# Patient Record
Sex: MALE | Race: WHITE | NOT HISPANIC OR LATINO | Employment: UNEMPLOYED | URBAN - NONMETROPOLITAN AREA
[De-identification: names, ages, dates, MRNs, and addresses within clinical notes are randomized per-mention and may not be internally consistent; named-entity substitution may affect disease eponyms.]

---
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
## 2023-03-09 ENCOUNTER — OFFICE VISIT (OUTPATIENT)
Dept: FAMILY MEDICINE CLINIC | Facility: CLINIC | Age: 23
End: 2023-03-09
Payer: COMMERCIAL

## 2023-03-09 ENCOUNTER — LAB (OUTPATIENT)
Dept: LAB | Facility: HOSPITAL | Age: 23
End: 2023-03-09
Payer: COMMERCIAL

## 2023-03-09 VITALS
SYSTOLIC BLOOD PRESSURE: 122 MMHG | OXYGEN SATURATION: 98 % | HEART RATE: 56 BPM | DIASTOLIC BLOOD PRESSURE: 88 MMHG | WEIGHT: 202.5 LBS | TEMPERATURE: 97.9 F | HEIGHT: 66 IN | BODY MASS INDEX: 32.54 KG/M2

## 2023-03-09 DIAGNOSIS — Z11.59 ENCOUNTER FOR HEPATITIS C SCREENING TEST FOR LOW RISK PATIENT: ICD-10-CM

## 2023-03-09 DIAGNOSIS — R79.89 ELEVATED SERUM CREATININE: ICD-10-CM

## 2023-03-09 DIAGNOSIS — Z00.00 ENCOUNTER FOR MEDICAL EXAMINATION TO ESTABLISH CARE: ICD-10-CM

## 2023-03-09 DIAGNOSIS — Z00.00 ENCOUNTER FOR MEDICAL EXAMINATION TO ESTABLISH CARE: Primary | ICD-10-CM

## 2023-03-09 DIAGNOSIS — E66.09 CLASS 1 OBESITY DUE TO EXCESS CALORIES WITH BODY MASS INDEX (BMI) OF 32.0 TO 32.9 IN ADULT, UNSPECIFIED WHETHER SERIOUS COMORBIDITY PRESENT: ICD-10-CM

## 2023-03-09 DIAGNOSIS — E55.9 VITAMIN D DEFICIENCY, UNSPECIFIED: ICD-10-CM

## 2023-03-09 PROCEDURE — 36415 COLL VENOUS BLD VENIPUNCTURE: CPT

## 2023-03-09 PROCEDURE — 85025 COMPLETE CBC W/AUTO DIFF WBC: CPT

## 2023-03-09 PROCEDURE — 83036 HEMOGLOBIN GLYCOSYLATED A1C: CPT

## 2023-03-09 PROCEDURE — 1160F RVW MEDS BY RX/DR IN RCRD: CPT | Performed by: FAMILY MEDICINE

## 2023-03-09 PROCEDURE — 80061 LIPID PANEL: CPT

## 2023-03-09 PROCEDURE — 99203 OFFICE O/P NEW LOW 30 MIN: CPT | Performed by: FAMILY MEDICINE

## 2023-03-09 PROCEDURE — 1159F MED LIST DOCD IN RCRD: CPT | Performed by: FAMILY MEDICINE

## 2023-03-09 PROCEDURE — 86803 HEPATITIS C AB TEST: CPT

## 2023-03-09 PROCEDURE — 80053 COMPREHEN METABOLIC PANEL: CPT

## 2023-03-09 PROCEDURE — 82306 VITAMIN D 25 HYDROXY: CPT

## 2023-03-09 NOTE — PROGRESS NOTES
Family Medicine Residency  Orquidea Ray MD    Subjective:     Kyle Willis is a 22 y.o. male with CMH of Anxiety, depression, ADHD, and obesity, who presents for restablishing care.      Establish care: Goes to [facility] May 30th.   No concerns at this time. Is exercising to lose weight.   Controlling his Anxiety, depression and ADHD with therapy as needed and exercise.   Would like to be checked for his comorbidities.   Patient otherwise healthy with no other concerns this visit.     The following portions of the patient's history were reviewed and updated as appropriate: allergies, current medications, past family history, past medical history, past social history, past surgical history and problem list.    Past Medical Hx:  Past Medical History:   Diagnosis Date   • ADHD (attention deficit hyperactivity disorder)     [facility] for behavior therapy      • Anxiety    • Asthma    • Backache    • Constipation    • Cough    • Depression    • Dysuria    • GERD (gastroesophageal reflux disease)    • Myopia    • Upper respiratory infection, viral        Past Surgical Hx:  History reviewed. No pertinent surgical history.    Current Meds:    Current Outpatient Medications:   •  vitamin D3 (Vitamin D) 125 MCG (5000 UT) capsule capsule, Take 1 capsule by mouth Daily for 90 days., Disp: 90 capsule, Rfl: 0    Allergies:  No Known Allergies    Family Hx:  Family History   Problem Relation Age of Onset   • Hypertension Mother    • Diabetes Mother    • Ulcers Mother    • Heart disease Maternal Grandfather         Social History:  Social History     Socioeconomic History   • Marital status: Single   Tobacco Use   • Smoking status: Never       Review of Systems  Review of Systems   Constitutional: Negative for chills and fever.   HENT: Positive for congestion. Negative for facial swelling, nosebleeds, rhinorrhea and trouble swallowing.    Eyes: Negative for photophobia and visual disturbance.   Respiratory: Negative for  "cough, choking, shortness of breath and stridor.    Cardiovascular: Negative for chest pain, palpitations and leg swelling.   Gastrointestinal: Negative for abdominal distention, diarrhea, nausea and vomiting.   Endocrine: Negative for polydipsia and polyuria.   Genitourinary: Negative for difficulty urinating and dysuria.        Dark yellow urine   Musculoskeletal: Negative for arthralgias and gait problem.   Skin: Negative for rash.   Allergic/Immunologic: Negative for immunocompromised state.   Neurological: Negative for seizures, syncope and headaches.   Hematological: Does not bruise/bleed easily.   Psychiatric/Behavioral: Negative for dysphoric mood, hallucinations and sleep disturbance. The patient is not nervous/anxious.        Objective:     /88   Pulse 56   Temp 97.9 °F (36.6 °C)   Ht 167.6 cm (66\")   Wt 91.9 kg (202 lb 8 oz)   SpO2 98%   BMI 32.68 kg/m²   Physical Exam  Vitals reviewed.   Constitutional:       General: He is not in acute distress.     Appearance: Normal appearance. He is obese. He is not ill-appearing or diaphoretic.   HENT:      Nose: Nose normal.      Mouth/Throat:      Mouth: Mucous membranes are moist.      Pharynx: Oropharynx is clear.   Eyes:      Conjunctiva/sclera: Conjunctivae normal.   Cardiovascular:      Rate and Rhythm: Normal rate and regular rhythm.      Pulses: Normal pulses.      Heart sounds: Normal heart sounds. No murmur heard.    No friction rub. No gallop.   Pulmonary:      Effort: Pulmonary effort is normal.      Breath sounds: No wheezing, rhonchi or rales.   Musculoskeletal:         General: Normal range of motion.      Right lower leg: No edema.      Left lower leg: No edema.   Skin:     General: Skin is warm and dry.      Findings: No rash.   Neurological:      Mental Status: He is alert.   Psychiatric:         Attention and Perception: Attention normal.         Mood and Affect: Mood normal.         Speech: Speech normal.         Behavior: Behavior " normal. Behavior is cooperative.         Thought Content: Thought content normal.          Assessment/Plan:     Diagnoses and all orders for this visit:    1. Encounter for medical examination to establish care (Primary)  -     Cancel: Td (adult) Vaccine Not Adsorbed  -     Lipid panel; Future  -     Comprehensive metabolic panel; Future  -     CBC w AUTO Differential; Future  -     Vitamin D 25 hydroxy; Future  -     Cancel: Hemoglobin A1c; Future  -     Hemoglobin A1c; Future  Re-establishing care to fulfill vaccines needed to go to trip to [facility] and ensure he is in good health for his trip to [facility].     2. Class 1 obesity due to excess calories with body mass index (BMI) of 32.0 to 32.9 in adult, unspecified whether serious comorbidity present  -     Lipid panel; Future  -     Vitamin D 25 hydroxy; Future  -     Hemoglobin A1c; Future  -     Comprehensive metabolic panel; Future  Patient losing weight on his own with diet and exercise. Encouraged drinking more water and avoiding NSAIDs.     Follow up in 1 month for annual or after returning from Study Abroad to [facility] in May.     3. Vitamin D deficiency, unspecified  -     vitamin D3 (Vitamin D) 125 MCG (5000 UT) capsule capsule; Take 1 capsule by mouth Daily for 90 days.  Dispense: 90 capsule; Refill: 0  Comorbidity of obesity. Will check at this visit.     4. Encounter for hepatitis C screening test for low risk patient  -     Hepatitis C antibody; Future  Patient agreeable for checking for hepatitis c, discussed why this was done.     5. Elevated serum creatinine  -     Comprehensive metabolic panel; Future  -     Urinalysis With Culture If Indicated -; Future    TD to get done at health department or outside pharmacy. Relay when this is at future telephone follow up.   · Rx changes: None at this visit.   · Patient Education: As above. Lifestyle changes.   · Compliance at present is estimated to be excellent.    Follow-up:     Return in about 3 months  (around 6/9/2023) for Recheck wt.    Preventative:  Health Maintenance   Topic Date Due   • COVID-19 Vaccine (1) Never done   • ANNUAL PHYSICAL  Never done   • TDAP/TD VACCINES (2 - Td or Tdap) 03/08/2021   • INFLUENZA VACCINE  Never done   • HEPATITIS C SCREENING  Completed   • Pneumococcal Vaccine 0-64  Aged Out   Alcohol use:  has no history on file for alcohol use.  Nicotine status  reports that he has never smoked. He does not have any smokeless tobacco history on file.     Goals     •  Weight loss and Exercise (pt-stated)       Pt is adding more leafy greens and fresh vegetables in their diet. Also is increasing calisthenic exercises when unable to walk 2 miles per day due to temperature extremes in summer.          RISK SCORE: 3          This document has been electronically signed by Orquidea Ray MD on March 10, 2023 07:53 CDT

## 2023-03-10 LAB
25(OH)D3 SERPL-MCNC: 18.5 NG/ML (ref 30–100)
ALBUMIN SERPL-MCNC: 4.9 G/DL (ref 3.5–5.2)
ALBUMIN/GLOB SERPL: 1.8 G/DL
ALP SERPL-CCNC: 57 U/L (ref 39–117)
ALT SERPL W P-5'-P-CCNC: 30 U/L (ref 1–41)
ANION GAP SERPL CALCULATED.3IONS-SCNC: 9.1 MMOL/L (ref 5–15)
AST SERPL-CCNC: 18 U/L (ref 1–40)
BASOPHILS # BLD AUTO: 0.03 10*3/MM3 (ref 0–0.2)
BASOPHILS NFR BLD AUTO: 0.4 % (ref 0–1.5)
BILIRUB SERPL-MCNC: 0.3 MG/DL (ref 0–1.2)
BUN SERPL-MCNC: 20 MG/DL (ref 6–20)
BUN/CREAT SERPL: 14.6 (ref 7–25)
CALCIUM SPEC-SCNC: 10 MG/DL (ref 8.6–10.5)
CHLORIDE SERPL-SCNC: 102 MMOL/L (ref 98–107)
CHOLEST SERPL-MCNC: 204 MG/DL (ref 0–200)
CO2 SERPL-SCNC: 28.9 MMOL/L (ref 22–29)
CREAT SERPL-MCNC: 1.37 MG/DL (ref 0.76–1.27)
DEPRECATED RDW RBC AUTO: 42.4 FL (ref 37–54)
EGFRCR SERPLBLD CKD-EPI 2021: 74.3 ML/MIN/1.73
EOSINOPHIL # BLD AUTO: 0.05 10*3/MM3 (ref 0–0.4)
EOSINOPHIL NFR BLD AUTO: 0.6 % (ref 0.3–6.2)
ERYTHROCYTE [DISTWIDTH] IN BLOOD BY AUTOMATED COUNT: 12.7 % (ref 12.3–15.4)
GLOBULIN UR ELPH-MCNC: 2.7 GM/DL
GLUCOSE SERPL-MCNC: 95 MG/DL (ref 65–99)
HBA1C MFR BLD: 5.1 % (ref 4.8–5.6)
HCT VFR BLD AUTO: 51.6 % (ref 37.5–51)
HCV AB SER DONR QL: NORMAL
HDLC SERPL-MCNC: 39 MG/DL (ref 40–60)
HGB BLD-MCNC: 17.6 G/DL (ref 13–17.7)
IMM GRANULOCYTES # BLD AUTO: 0.02 10*3/MM3 (ref 0–0.05)
IMM GRANULOCYTES NFR BLD AUTO: 0.2 % (ref 0–0.5)
LDLC SERPL CALC-MCNC: 130 MG/DL (ref 0–100)
LDLC/HDLC SERPL: 3.22 {RATIO}
LYMPHOCYTES # BLD AUTO: 2.51 10*3/MM3 (ref 0.7–3.1)
LYMPHOCYTES NFR BLD AUTO: 30.5 % (ref 19.6–45.3)
MCH RBC QN AUTO: 30.9 PG (ref 26.6–33)
MCHC RBC AUTO-ENTMCNC: 34.1 G/DL (ref 31.5–35.7)
MCV RBC AUTO: 90.7 FL (ref 79–97)
MONOCYTES # BLD AUTO: 0.44 10*3/MM3 (ref 0.1–0.9)
MONOCYTES NFR BLD AUTO: 5.3 % (ref 5–12)
NEUTROPHILS NFR BLD AUTO: 5.19 10*3/MM3 (ref 1.7–7)
NEUTROPHILS NFR BLD AUTO: 63 % (ref 42.7–76)
NRBC BLD AUTO-RTO: 0 /100 WBC (ref 0–0.2)
PLATELET # BLD AUTO: 148 10*3/MM3 (ref 140–450)
PMV BLD AUTO: 10.5 FL (ref 6–12)
POTASSIUM SERPL-SCNC: 4.8 MMOL/L (ref 3.5–5.2)
PROT SERPL-MCNC: 7.6 G/DL (ref 6–8.5)
RBC # BLD AUTO: 5.69 10*6/MM3 (ref 4.14–5.8)
SODIUM SERPL-SCNC: 140 MMOL/L (ref 136–145)
TRIGL SERPL-MCNC: 197 MG/DL (ref 0–150)
VLDLC SERPL-MCNC: 35 MG/DL (ref 5–40)
WBC NRBC COR # BLD: 8.24 10*3/MM3 (ref 3.4–10.8)

## 2023-03-14 NOTE — PROGRESS NOTES
SIGNIFICANT NOTE  NAME: Kyle Willis  : 2000  MRN: [id number]    Called patient on 3/14/2023 at 16:51 CDT. Notified patient that he does not have prediabetes or diabetes. As his renal function is up, I am sending orders today for repeat CMP and a one time urinalysis. If anything is abnormal I plan to do imaging. Patient reports that he drinks up to 6 bottles of water a day now and is walking more. He also received his Tdap vaccine today at [facility].  He also reports that he has several questions.    1. He picked up his vitamin D supplements and wants to know if it is lifelong. Discussed with him that as he loses weight he many not need to supplement anymore but we would need to monitor his vitamin D levels to know if that happens. It could be something that is genetic. To this he was concerned as his mother is obese with diabetes and is on vitamin D supplements. Reassured that by addressing it and trying to lose weight there is a good chance he many not need to do lifetime supplementation.     2. Should he be taking supplements with his poor diet. Would that help with his hair falling out? Patient reports that he eats fruits, vegetables and baked meats. He is not taking creatinine supplements, protein shakes and minimally eats protein bars. Notified him as he is not vegan or lacking certain foods, no supplements are needed. His father is balding and patient reports that his balding is just hair thinning and falling out. It is not occurring in patches. If changes, notified him we can check his thyroid and he can come in to be examined.     3. He also wanted to know the foods he needs to eat. He can eat vitamin D fortified foods.     4. What vaccines will he need to go to [place].   Https://wwwnc.cdc.gov/travel/destinations/traveler/none/[place]     Previously Hepatitis A needed and notified him I'd call health department if this would be covered there or cheaper there, however, as it is not needed on  the CDC website will refrain from that. Will follow up once results of his CMP and UA are in and discuss also this change with vaccines and if he will be exposed to wildlife or ticks.     He had no further questions at this time.         This document has been electronically signed by Orquidea Ray MD on March 14, 2023 16:51 CDT

## 2023-03-15 ENCOUNTER — LAB (OUTPATIENT)
Dept: LAB | Facility: HOSPITAL | Age: 23
End: 2023-03-15
Payer: COMMERCIAL

## 2023-03-15 DIAGNOSIS — R79.89 ELEVATED SERUM CREATININE: ICD-10-CM

## 2023-03-15 LAB
ALBUMIN SERPL-MCNC: 4.3 G/DL (ref 3.5–5.2)
ALBUMIN/GLOB SERPL: 1.5 G/DL
ALP SERPL-CCNC: 50 U/L (ref 39–117)
ALT SERPL W P-5'-P-CCNC: 31 U/L (ref 1–41)
ANION GAP SERPL CALCULATED.3IONS-SCNC: 11.2 MMOL/L (ref 5–15)
AST SERPL-CCNC: 23 U/L (ref 1–40)
BILIRUB SERPL-MCNC: 0.4 MG/DL (ref 0–1.2)
BILIRUB UR QL STRIP: NEGATIVE
BUN SERPL-MCNC: 17 MG/DL (ref 6–20)
BUN/CREAT SERPL: 15.2 (ref 7–25)
CALCIUM SPEC-SCNC: 9.8 MG/DL (ref 8.6–10.5)
CHLORIDE SERPL-SCNC: 102 MMOL/L (ref 98–107)
CLARITY UR: CLEAR
CO2 SERPL-SCNC: 25.8 MMOL/L (ref 22–29)
COLOR UR: YELLOW
CREAT SERPL-MCNC: 1.12 MG/DL (ref 0.76–1.27)
EGFRCR SERPLBLD CKD-EPI 2021: 94.7 ML/MIN/1.73
GLOBULIN UR ELPH-MCNC: 2.9 GM/DL
GLUCOSE SERPL-MCNC: 84 MG/DL (ref 65–99)
GLUCOSE UR STRIP-MCNC: NEGATIVE MG/DL
HGB UR QL STRIP.AUTO: NEGATIVE
KETONES UR QL STRIP: NEGATIVE
LEUKOCYTE ESTERASE UR QL STRIP.AUTO: NEGATIVE
NITRITE UR QL STRIP: NEGATIVE
PH UR STRIP.AUTO: 5.5 [PH] (ref 5–9)
POTASSIUM SERPL-SCNC: 4.7 MMOL/L (ref 3.5–5.2)
PROT SERPL-MCNC: 7.2 G/DL (ref 6–8.5)
PROT UR QL STRIP: NEGATIVE
SODIUM SERPL-SCNC: 139 MMOL/L (ref 136–145)
SP GR UR STRIP: 1.01 (ref 1–1.03)
UROBILINOGEN UR QL STRIP: NORMAL

## 2023-03-15 PROCEDURE — 36415 COLL VENOUS BLD VENIPUNCTURE: CPT

## 2023-03-15 PROCEDURE — 80053 COMPREHEN METABOLIC PANEL: CPT

## 2023-03-15 PROCEDURE — 81003 URINALYSIS AUTO W/O SCOPE: CPT

## 2023-03-16 ENCOUNTER — DOCUMENTATION (OUTPATIENT)
Dept: FAMILY MEDICINE CLINIC | Facility: CLINIC | Age: 23
End: 2023-03-16
Payer: COMMERCIAL

## 2023-03-16 NOTE — PROGRESS NOTES
SIGNIFICANT NOTE  NAME: Kyle Willis  : 2000  MRN: [id number]    Called patient on 3/16/2023 at 07:35 CDT. Reassured Urinalysis an repeat CMP were WNLS. Continue drinking water cutting down to around 3 water bottles is fine. Had question of light yellow colored urine was normal, notified him this is if you are well-hydrated. No further questions this morning.         This document has been electronically signed by Orquidea Ray MD on 2023 07:35 CDT

## 2023-03-16 NOTE — PROGRESS NOTES
I have spoken with the patient .  I have reviewed the notes, assessments, and/or procedures performed by Dr. Orquidea Ray,   I concur with   her  documentation and assessment and plan for Kyle Willis.          This document has been electronically signed by Joaquin Lemon MD on March 16, 2023 13:35 CDT

## 2023-04-24 ENCOUNTER — TELEPHONE (OUTPATIENT)
Dept: FAMILY MEDICINE CLINIC | Facility: CLINIC | Age: 23
End: 2023-04-24
Payer: COMMERCIAL

## 2023-04-24 ENCOUNTER — DOCUMENTATION (OUTPATIENT)
Dept: FAMILY MEDICINE CLINIC | Facility: CLINIC | Age: 23
End: 2023-04-24
Payer: COMMERCIAL

## 2023-04-24 NOTE — PROGRESS NOTES
SIGNIFICANT NOTE  NAME: Kyle Willis  : 2000  MRN: [id number]    Called patient on 2023 at 18:04 CDT. Patient reports this morning he had sharp abdominal pain and went to the emergency department. He was diagnosed with Renal stone and was given fluids and flomax. A CT Abdomen was done after pain subsided and no further stones were appreciated. His concerns thereafter were the taking of his vitamin D, his family history and during the ED stay, having a heart rate range of 36-40 bpm.     Kidney stone: Patient noted this is the first time he has erin had a kidney stone. Notes his father has renal agenesis and maternal uncles have had kidney stones but, that the only maternal uncle he can remember having stones was not blood-related. 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 He notified me his study abroad trip covers health insurance so if he needs to  flomax, he will be able to. He also increased in water intake to 3-4 bottles of water a day as this had dropped to less than 2 bottles prior to the episode this morning.     Vitamin D deficiency: Discussed with him that dehydration and having vitamin D deficiency are risk factors for forming kidney stones. Furthermore, from a 2017 [employer] Education and Research, recommendations based off their study was that vitamin D repletion not be pulled solely for kidney stone disease. Strongly encouraged patient restart taking vitamin D supplementation starting tomorrow. Also encouraged to follow up after his return from [email or web address] at the end of May, so that we can do a urinalysis to check his urine calcium level.     Persistent sinus bradycardia: Patient reported during his ED stay this morning, his heart rate ranged from 36-42. Noted that he did not have any shortness of breath, chest pain or weakness at that time. A 12-lead EKG was done and he was  notified it was normal. His vitals were /62, saturation of 97%. Denied known family history of cardiac arrhythmia. Encouraged him to follow up after his return form [facility] [[email or web address] at end of May, so I can recheck his heart and make sure a holter monitor is not indicated. Instructed him to monitor himself for chest pain, shortness of breath or weakness, if occurs he is to  get checked out.         This document has been electronically signed by Orquidea Ray MD on April 24, 2023 18:04 CDT

## 2023-04-24 NOTE — TELEPHONE ENCOUNTER
Patient called to let Dr Ray know that he was just released from the emergency room with kidney stones. He stated he takes vitamin D and wanted to know if he should continue to take that medication.    His#[phone]

## 2023-07-20 ENCOUNTER — LAB (OUTPATIENT)
Dept: LAB | Facility: HOSPITAL | Age: 23
End: 2023-07-20
Payer: COMMERCIAL

## 2023-07-21 ENCOUNTER — LAB (OUTPATIENT)
Dept: LAB | Facility: HOSPITAL | Age: 23
End: 2023-07-21
Payer: COMMERCIAL

## 2023-07-21 DIAGNOSIS — D22.9 SUSPICIOUS NEVUS: Primary | ICD-10-CM

## 2023-07-25 LAB — REF LAB TEST METHOD: NORMAL

## 2023-07-26 ENCOUNTER — TELEPHONE (OUTPATIENT)
Dept: ADMINISTRATIVE | Facility: CLINIC | Age: 23
End: 2023-07-26
Payer: COMMERCIAL

## 2023-07-26 NOTE — TELEPHONE ENCOUNTER
Called patient to inform him biopsy results showed it was just a benign mole.  He is happy with that news.  Advised him to let me know if there is anything else I can do for him.        This document has been electronically signed by Bobo Cevallos MD on July 26, 2023 09:11 CDT